# Patient Record
Sex: FEMALE | Race: WHITE | NOT HISPANIC OR LATINO
[De-identification: names, ages, dates, MRNs, and addresses within clinical notes are randomized per-mention and may not be internally consistent; named-entity substitution may affect disease eponyms.]

---

## 2023-07-19 PROBLEM — Z00.00 ENCOUNTER FOR PREVENTIVE HEALTH EXAMINATION: Status: ACTIVE | Noted: 2023-07-19

## 2023-07-27 ENCOUNTER — APPOINTMENT (OUTPATIENT)
Dept: ORTHOPEDIC SURGERY | Facility: CLINIC | Age: 33
End: 2023-07-27

## 2023-08-14 ENCOUNTER — NON-APPOINTMENT (OUTPATIENT)
Age: 33
End: 2023-08-14

## 2023-08-15 ENCOUNTER — APPOINTMENT (OUTPATIENT)
Dept: ORTHOPEDIC SURGERY | Facility: CLINIC | Age: 33
End: 2023-08-15
Payer: COMMERCIAL

## 2023-08-15 DIAGNOSIS — M25.561 PAIN IN RIGHT KNEE: ICD-10-CM

## 2023-08-15 PROCEDURE — 73562 X-RAY EXAM OF KNEE 3: CPT | Mod: RT

## 2023-08-15 PROCEDURE — 99203 OFFICE O/P NEW LOW 30 MIN: CPT

## 2023-08-15 NOTE — END OF VISIT
[FreeTextEntry3] : All medical record entries made by the Scribe were at my, Dr. Jacob Castellanos MD., direction and personally dictated by me on 08/15/2023. I have personally reviewed the chart and agree that the record accurately reflects my personal performance of the history, physical exam, assessment and plan.

## 2023-08-15 NOTE — DISCUSSION/SUMMARY
[de-identified] : The underlying pathophysiology was reviewed with the patient. XR films were reviewed with the patient. Discussed at length the nature of the patients condition.   At this time, I recommended she begin a course of PT for strengthening. I advised cross training with low impact activities and to avoid high impact activities such as running and jumping. Furthermore, I recommended an MRI of her right knee. Finally, she was instructed to follow up with a rheumatologist for further evaluation and to rule out any inflammatory conditions. She was provided with a list of referrals in this regard.   The patient wishes to proceed with physical therapy of the right knee (strengthening). A script was given.	 An MRI of the right knee was ordered to evaluate for meniscus tear versus ligamentous injury. Patient will follow-up to review the results and discuss further treatment recommendations.	   All questions answered, understanding verbalized. Patient in agreement with plan of care. Follow up to review MRI results.

## 2023-08-15 NOTE — ADDENDUM
[FreeTextEntry1] : I, Emir Oviedo wrote this note acting as a scribe for Dr. Jacob Castellanos on Aug 15, 2023

## 2023-08-15 NOTE — HISTORY OF PRESENT ILLNESS
[de-identified] : Pt is a 34 y/o female with right knee pain for the past 6 weeks. Patient says at first it was very swollen and couldn't put any pressure on it. She also explains how sometimes she can't bend it and other times she can't extend it. Patient denies any recent injuries but does run 30-40 miles per week, however has stopped as of 6 weeks ago. Patient also says there is no other pain anywhere else in her body as far as other joint pain. Patient has trained for a half marathon for the past 8 months up until onset and also followed a supplemental strengthening program in addition to the running program.

## 2023-08-15 NOTE — PHYSICAL EXAM
[de-identified] : Patient is WDWN, alert, and in no acute distress. Breathing is unlabored. She is grossly oriented to person, place, and time.  Right knee: There is no medial/lateral joint line tenderness to palpation. No tenderness along the patellar or quadriceps tendon. No swelling, no valgus or valgus instability present on provocative testing. Range of motion: Active flexion and extension are full actively.  Tests and Signs: All tests for stability are normal. Ally's Test is Negative. Strength: flexion and extension 5/5 [de-identified] : AP, lateral, skyline, and tunnel views of the RIGHT knee were obtained today and revealed no abnormalities. No acute fracture. No dislocation. Cartilage spaces are maintained.

## 2023-12-04 ENCOUNTER — APPOINTMENT (OUTPATIENT)
Dept: ORTHOPEDIC SURGERY | Facility: CLINIC | Age: 33
End: 2023-12-04
Payer: COMMERCIAL

## 2023-12-04 VITALS — WEIGHT: 115 LBS | BODY MASS INDEX: 21.71 KG/M2 | HEIGHT: 61 IN

## 2023-12-04 DIAGNOSIS — M23.91 UNSPECIFIED INTERNAL DERANGEMENT OF RIGHT KNEE: ICD-10-CM

## 2023-12-04 DIAGNOSIS — Z60.2 PROBLEMS RELATED TO LIVING ALONE: ICD-10-CM

## 2023-12-04 DIAGNOSIS — Z87.898 PERSONAL HISTORY OF OTHER SPECIFIED CONDITIONS: ICD-10-CM

## 2023-12-04 DIAGNOSIS — R55 SYNCOPE AND COLLAPSE: ICD-10-CM

## 2023-12-04 PROCEDURE — 73564 X-RAY EXAM KNEE 4 OR MORE: CPT | Mod: LT

## 2023-12-04 PROCEDURE — 99214 OFFICE O/P EST MOD 30 MIN: CPT

## 2023-12-04 RX ORDER — SPIRONOLACTONE 25 MG/1
25 TABLET ORAL
Refills: 0 | Status: ACTIVE | COMMUNITY

## 2023-12-04 SDOH — SOCIAL STABILITY - SOCIAL INSECURITY: PROBLEMS RELATED TO LIVING ALONE: Z60.2

## 2025-02-19 ENCOUNTER — NON-APPOINTMENT (OUTPATIENT)
Age: 35
End: 2025-02-19

## 2025-02-19 ENCOUNTER — APPOINTMENT (OUTPATIENT)
Dept: ORTHOPEDIC SURGERY | Facility: CLINIC | Age: 35
End: 2025-02-19
Payer: COMMERCIAL

## 2025-02-19 VITALS — HEIGHT: 61 IN | WEIGHT: 120 LBS | BODY MASS INDEX: 22.66 KG/M2

## 2025-02-19 DIAGNOSIS — M76.31 ILIOTIBIAL BAND SYNDROME, RIGHT LEG: ICD-10-CM

## 2025-02-19 PROCEDURE — 99203 OFFICE O/P NEW LOW 30 MIN: CPT
